# Patient Record
Sex: FEMALE | Race: WHITE | Employment: OTHER | URBAN - METROPOLITAN AREA
[De-identification: names, ages, dates, MRNs, and addresses within clinical notes are randomized per-mention and may not be internally consistent; named-entity substitution may affect disease eponyms.]

---

## 2016-06-17 LAB
HEP C HIM: NORMAL
PAP-ABSTRACT: NORMAL

## 2017-06-20 ENCOUNTER — TRANSFERRED RECORDS (OUTPATIENT)
Dept: HEALTH INFORMATION MANAGEMENT | Facility: CLINIC | Age: 62
End: 2017-06-20

## 2017-07-26 ENCOUNTER — OFFICE VISIT (OUTPATIENT)
Dept: FAMILY MEDICINE | Facility: CLINIC | Age: 62
End: 2017-07-26
Payer: COMMERCIAL

## 2017-07-26 VITALS
OXYGEN SATURATION: 95 % | BODY MASS INDEX: 27.79 KG/M2 | WEIGHT: 162.8 LBS | DIASTOLIC BLOOD PRESSURE: 78 MMHG | HEART RATE: 86 BPM | SYSTOLIC BLOOD PRESSURE: 128 MMHG | HEIGHT: 64 IN | TEMPERATURE: 98.1 F

## 2017-07-26 DIAGNOSIS — R42 DIZZINESS: Primary | ICD-10-CM

## 2017-07-26 DIAGNOSIS — L90.0 LICHEN SCLEROSUS: ICD-10-CM

## 2017-07-26 LAB
ALBUMIN SERPL-MCNC: 3.9 G/DL (ref 3.4–5)
ALP SERPL-CCNC: 90 U/L (ref 40–150)
ALT SERPL W P-5'-P-CCNC: 39 U/L (ref 0–50)
ANION GAP SERPL CALCULATED.3IONS-SCNC: 7 MMOL/L (ref 3–14)
AST SERPL W P-5'-P-CCNC: 24 U/L (ref 0–45)
BASOPHILS # BLD AUTO: 0 10E9/L (ref 0–0.2)
BASOPHILS NFR BLD AUTO: 0.6 %
BILIRUB SERPL-MCNC: 0.3 MG/DL (ref 0.2–1.3)
BUN SERPL-MCNC: 14 MG/DL (ref 7–30)
CALCIUM SERPL-MCNC: 9.4 MG/DL (ref 8.5–10.1)
CHLORIDE SERPL-SCNC: 105 MMOL/L (ref 94–109)
CO2 SERPL-SCNC: 29 MMOL/L (ref 20–32)
CREAT SERPL-MCNC: 0.86 MG/DL (ref 0.52–1.04)
DIFFERENTIAL METHOD BLD: NORMAL
EOSINOPHIL # BLD AUTO: 0.1 10E9/L (ref 0–0.7)
EOSINOPHIL NFR BLD AUTO: 1.7 %
ERYTHROCYTE [DISTWIDTH] IN BLOOD BY AUTOMATED COUNT: 12.5 % (ref 10–15)
GFR SERPL CREATININE-BSD FRML MDRD: 67 ML/MIN/1.7M2
GLUCOSE SERPL-MCNC: 98 MG/DL (ref 70–99)
HBA1C MFR BLD: 5.5 % (ref 4.3–6)
HCT VFR BLD AUTO: 42.4 % (ref 35–47)
HGB BLD-MCNC: 14.2 G/DL (ref 11.7–15.7)
LYMPHOCYTES # BLD AUTO: 2.4 10E9/L (ref 0.8–5.3)
LYMPHOCYTES NFR BLD AUTO: 34.5 %
MCH RBC QN AUTO: 31 PG (ref 26.5–33)
MCHC RBC AUTO-ENTMCNC: 33.5 G/DL (ref 31.5–36.5)
MCV RBC AUTO: 93 FL (ref 78–100)
MONOCYTES # BLD AUTO: 0.8 10E9/L (ref 0–1.3)
MONOCYTES NFR BLD AUTO: 11.1 %
NEUTROPHILS # BLD AUTO: 3.6 10E9/L (ref 1.6–8.3)
NEUTROPHILS NFR BLD AUTO: 52.1 %
PLATELET # BLD AUTO: 327 10E9/L (ref 150–450)
POTASSIUM SERPL-SCNC: 3.8 MMOL/L (ref 3.4–5.3)
PROT SERPL-MCNC: 7.4 G/DL (ref 6.8–8.8)
RBC # BLD AUTO: 4.58 10E12/L (ref 3.8–5.2)
SODIUM SERPL-SCNC: 141 MMOL/L (ref 133–144)
TSH SERPL DL<=0.005 MIU/L-ACNC: 0.9 MU/L (ref 0.4–4)
WBC # BLD AUTO: 7 10E9/L (ref 4–11)

## 2017-07-26 PROCEDURE — 80050 GENERAL HEALTH PANEL: CPT | Performed by: PHYSICIAN ASSISTANT

## 2017-07-26 PROCEDURE — 99214 OFFICE O/P EST MOD 30 MIN: CPT | Performed by: PHYSICIAN ASSISTANT

## 2017-07-26 PROCEDURE — 36415 COLL VENOUS BLD VENIPUNCTURE: CPT | Performed by: PHYSICIAN ASSISTANT

## 2017-07-26 PROCEDURE — 83036 HEMOGLOBIN GLYCOSYLATED A1C: CPT | Performed by: PHYSICIAN ASSISTANT

## 2017-07-26 RX ORDER — MECLIZINE HCL 25MG 25 MG/1
25 TABLET, CHEWABLE ORAL EVERY 6 HOURS PRN
COMMUNITY

## 2017-07-26 RX ORDER — CLOBETASOL PROPIONATE 0.5 MG/G
CREAM TOPICAL
Qty: 60 G | Refills: 3 | Status: SHIPPED | OUTPATIENT
Start: 2017-07-26

## 2017-07-26 NOTE — PROGRESS NOTES
SUBJECTIVE:                                                    Loreto Lima is a 61 year old female who presents to clinic today for the following health issues:      Dizziness  Onset: 2 weeks    Description:   Do you feel faint:  no   Does it feel like the surroundings (bed, room) are moving: no  Unsteady/off balance: YES  Have you passed out or fallen: no     Progression of Symptoms:  same    Accompanying Signs & Symptoms:  Heart palpitations: no   Nausea, vomiting: YES- nausea  Weakness in arms or legs: no   Fatigue: YES  Vision or speech changes: no   Ringing in ears (Tinnitus): no   Hearing Loss: no     History:   Head trauma/concussion hx: no   Previous similar symptoms: no   Recent bleeding history: no     Precipitating factors:   Worse with activity or head movement: YES  Any new medications (BP?): no   Alcohol/drug abuse/withdrawal: no     Alleviating factors:   Does staying in a fixed position give relief:  no     Therapies Tried and outcome: OTC Bonine     She has been noticing that she is wobbly when standing still. NO spinning. Just feels off balance. All day long. Nothing seems to make it better or worse.   Trying to drink more water.   No new medications. Took over the counter meclizine yesterday which helped a lot.   Father with diabetes.             Rash  Duration of complaint: 1 year, rash is itchy, rash goes from in between vagina and butt, and pt reports it is painful.    Not sexually active.   Has been using a steroid cream BID and it didn't help. Tried diflucan x 2 which out any improvement. When it flares it hurts.   Has seen GYN for this. Reviewed care everywhere and patient was diagnosed with lichen sclerosis. Patient notes she never got the prescription for clobetasol as was prescribed.         Problem list and histories reviewed & adjusted, as indicated.  Additional history: as documented      Reviewed and updated as needed this visit by clinical staffTobacco  Allergies  Meds  Med  "Hx  Surg Hx  Fam Hx  Soc Hx      Reviewed and updated as needed this visit by Provider         ROS:  Constitutional, HEENT, cardiovascular, pulmonary, gi and gu systems are negative, except as otherwise noted.      OBJECTIVE:   /78 (BP Location: Right arm, Patient Position: Chair, Cuff Size: Adult Regular)  Pulse 86  Temp 98.1  F (36.7  C) (Oral)  Ht 5' 3.62\" (1.616 m)  Wt 162 lb 12.8 oz (73.8 kg)  SpO2 95%  BMI 28.28 kg/m2  Body mass index is 28.28 kg/(m^2).  GENERAL: healthy, alert and no distress  EYES: Eyes grossly normal to inspection, PERRL and conjunctivae and sclerae normal  HENT: ear canals and TM's normal, nose and mouth without ulcers or lesions  NECK: no adenopathy, no asymmetry, masses, or scars and thyroid normal to palpation  RESP: lungs clear to auscultation - no rales, rhonchi or wheezes  CV: regular rate and rhythm, normal S1 S2, no S3 or S4, no murmur, click or rub, no peripheral edema and peripheral pulses strong  MS: no gross musculoskeletal defects noted, no edema  SKIN: On the skin surrounding the rectum is is red and irritated with minimal thickening. This extends up to the lower edges of the labia.   NEURO: Normal strength and tone, mentation intact and speech normal, CN II-XII intact. deep tendon reflexes normal.     Diagnostic Test Results:  none     ASSESSMENT/PLAN:       ICD-10-CM    1. Dizziness R42 Comprehensive metabolic panel     CBC with platelets differential     TSH with free T4 reflex     Hemoglobin A1c   2. Lichen sclerosus L90.0 clobetasol (TEMOVATE) 0.05 % cream   Can continue to use over the counter meclizine for dizziness. Basic labs today. No worrisome neurological findings on exam.   Will try the clobetasol for 6-12 weeks if not improving will need to see derm.     FUTURE APPOINTMENTS:       - Follow-up for annual visit or as needed    Dorcas Jay PA-C  [facility]    "

## 2017-07-26 NOTE — NURSING NOTE
"Chief Complaint   Patient presents with     Dizziness     Health Maintenance     Hep C, Mammo, and Pap       Initial /78 (BP Location: Right arm, Patient Position: Chair, Cuff Size: Adult Regular)  Pulse 86  Temp 98.1  F (36.7  C) (Oral)  Ht 5' 3.62\" (1.616 m)  Wt 162 lb 12.8 oz (73.8 kg)  SpO2 95%  BMI 28.28 kg/m2 Estimated body mass index is 28.28 kg/(m^2) as calculated from the following:    Height as of this encounter: 5' 3.62\" (1.616 m).    Weight as of this encounter: 162 lb 12.8 oz (73.8 kg).  Medication Reconciliation: lizbeth Stevens MA      "

## 2017-07-26 NOTE — MR AVS SNAPSHOT
"              After Visit Summary   7/26/2017    Loreto Lima    MRN: [id number]           Patient Information     Date Of Birth          1955        Visit Information        Provider Department      7/26/2017 2:00 PM Dorcas Jay PA-C [facility]        Today's Diagnoses     Dizziness    -  1    Lichen sclerosus           Follow-ups after your visit        Who to contact     If you have questions or need follow up information about today's clinic visit or your schedule please contact [facility] directly at [phone].  Normal or non-critical lab and imaging results will be communicated to you by Nimblefish Technologieshart, letter or phone within 4 business days after the clinic has received the results. If you do not hear from us within 7 days, please contact the clinic through coin4cet or phone. If you have a critical or abnormal lab result, we will notify you by phone as soon as possible.  Submit refill requests through SoBiz10 or call your pharmacy and they will forward the refill request to us. Please allow 3 business days for your refill to be completed.          Additional Information About Your Visit        MyChart Information     [email or web address] gives you secure access to your electronic health record. If you see a primary care provider, you can also send messages to your care team and make appointments. If you have questions, please call your primary care clinic.  If you do not have a primary care provider, please call [phone] and they will assist you.        Care EveryWhere ID     This is your Care EveryWhere ID. This could be used by other organizations to access your [facility] medical records  [id number]        Your Vitals Were     Pulse Temperature Height Pulse Oximetry BMI (Body Mass Index)       86 98.1  F (36.7  C) (Oral) 5' 3.62\" (1.616 m) 95% 28.28 kg/m2        Blood Pressure from Last 3 Encounters:   07/26/17 128/78   01/12/16 111/68   02/11/15 124/75    " Weight from Last 3 Encounters:   07/26/17 162 lb 12.8 oz (73.8 kg)   01/12/16 167 lb (75.8 kg)   02/11/15 164 lb (74.4 kg)              We Performed the Following     CBC with platelets differential     Comprehensive metabolic panel     Hemoglobin A1c     TSH with free T4 reflex        Primary Care Provider Office Phone # Fax #    Augustine Bender [phone]       [facility] [address]  [place], [place]. \Hospital Has a New Name and Outlook.\"" [id number]        Equal Access to Services     MATT WU : Hadii aad ku hadasho Soomaali, waaxda luqadaha, qaybta kaalmada adeegyada, waxay idiin hayaan adeeg vicente riojas . So [facility] [phone].    ATENCIÓN: Si habla español, tiene a verdugo disposición servicios gratuitos de asistencia lingüística. Llame al [phone].    We comply with applicable federal civil rights laws and [place] laws. We do not discriminate on the basis of race, color, national origin, age, disability sex, sexual orientation or gender identity.            Thank you!     Thank you for choosing [facility]  for your care. Our goal is always to provide you with excellent care. Hearing back from our patients is one way we can continue to improve our services. Please take a few minutes to complete the written survey that you may receive in the mail after your visit with us. Thank you!             Your Updated Medication List - Protect others around you: Learn how to safely use, store and throw away your medicines at www.disposemymeds.org.          This list is accurate as of: 7/26/17  2:34 PM.  Always use your most recent med list.                   Brand Name Dispense Instructions for use Diagnosis    acyclovir 5 % ointment    ZOVIRAX    15 g    Apply up to 5 x daily as needed for cold sores    Recurrent cold sores       DAILY MULTIVITAMIN PO      Take  by mouth daily.        meclizine 25 MG Chew      Take 25 mg by mouth every 6 hours as needed for dizziness        triamcinolone  0.1 % cream    KENALOG    30 g    Apply sparingly to affected areas 2-3 x daily as needed    Eczema, unspecified eczema

## 2017-11-27 ENCOUNTER — TRANSFERRED RECORDS (OUTPATIENT)
Dept: HEALTH INFORMATION MANAGEMENT | Facility: CLINIC | Age: 62
End: 2017-11-27

## 2018-10-09 ENCOUNTER — OFFICE VISIT (OUTPATIENT)
Dept: DERMATOLOGY | Facility: CLINIC | Age: 63
End: 2018-10-09
Payer: COMMERCIAL

## 2018-10-09 DIAGNOSIS — L82.1 SEBORRHEIC KERATOSIS: ICD-10-CM

## 2018-10-09 DIAGNOSIS — L90.0 LICHEN SCLEROSUS: ICD-10-CM

## 2018-10-09 DIAGNOSIS — L21.9 DERMATITIS, SEBORRHEIC: ICD-10-CM

## 2018-10-09 DIAGNOSIS — L98.9 SKIN LESION: Primary | ICD-10-CM

## 2018-10-09 DIAGNOSIS — Z80.8 FAMILY HISTORY OF MELANOMA: ICD-10-CM

## 2018-10-09 DIAGNOSIS — L81.4 SOLAR LENTIGINOSIS: ICD-10-CM

## 2018-10-09 PROCEDURE — 99203 OFFICE O/P NEW LOW 30 MIN: CPT | Performed by: DERMATOLOGY

## 2018-10-09 RX ORDER — VALACYCLOVIR HYDROCHLORIDE 1 G/1
TABLET, FILM COATED ORAL
COMMUNITY
Start: 2018-01-18

## 2018-10-09 NOTE — MR AVS SNAPSHOT
After Visit Summary   10/9/2018    Loreto Lima    MRN: [id number]           Patient Information     Date Of Birth          1955        Visit Information        Provider Department      10/9/2018 12:15 PM Mariel Pagan MD [facility]        Today's Diagnoses     Skin lesion    -  1    Family history of melanoma        Solar lentiginosis        Dermatitis, seborrheic        Lichen sclerosus        Seborrheic keratosis           Follow-ups after your visit        Follow-up notes from your care team     Return in about 2 weeks (around 10/23/2018) for spot on the right thigh.      Who to contact     If you have questions or need follow up information about today's clinic visit or your schedule please contact [facility] directly at [phone].  Normal or non-critical lab and imaging results will be communicated to you by Sticherhart, letter or phone within 4 business days after the clinic has received the results. If you do not hear from us within 7 days, please contact the clinic through Sticherhart or phone. If you have a critical or abnormal lab result, we will notify you by phone as soon as possible.  Submit refill requests through WebXiom or call your pharmacy and they will forward the refill request to us. Please allow 3 business days for your refill to be completed.          Additional Information About Your Visit        MyChart Information     WebXiom gives you secure access to your electronic health record. If you see a primary care provider, you can also send messages to your care team and make appointments. If you have questions, please call your primary care clinic.  If you do not have a primary care provider, please call [phone] and they will assist you.      WebXiom is an electronic gateway that provides easy, online access to your medical records. With WebXiom, you can request a clinic appointment, read your test results, renew a prescription or  communicate with your care team.     To access your existing account, please contact your [facility] or call [phone] for assistance.        Care EveryWhere ID     This is your Care EveryWhere ID. This could be used by other organizations to access your [facility] medical records  [id number]         Blood Pressure from Last 3 Encounters:   07/26/17 128/78   01/12/16 111/68   02/11/15 124/75    Weight from Last 3 Encounters:   07/26/17 73.8 kg (162 lb 12.8 oz)   01/12/16 75.8 kg (167 lb)   02/11/15 74.4 kg (164 lb)              Today, you had the following     No orders found for display       Primary Care Provider Office Phone # Fax #    Augustine Bender [phone]       [address] [facility] [address]        Equal Access to Services     [facility]: Hadii aad ku hadasho Soomaali, waaxda luqadaha, qaybta kaalmada adeegyada, waxay idiin hayaan ernesto riojas . So [facility] [phone].    ATENCIÓN: Si habla español, tiene a verdugo disposición servicios gratuitos de asistencia lingüística. Llame al [phone].    We comply with applicable federal civil rights laws and [place] laws. We do not discriminate on the basis of race, color, national origin, age, disability, sex, sexual orientation, or gender identity.            Thank you!     Thank you for choosing [facility]  for your care. Our goal is always to provide you with excellent care. Hearing back from our patients is one way we can continue to improve our services. Please take a few minutes to complete the written survey that you may receive in the mail after your visit with us. Thank you!             Your Updated Medication List - Protect others around you: Learn how to safely use, store and throw away your medicines at www.disposemymeds.org.          This list is accurate as of 10/9/18  1:03 PM.  Always use your most recent med list.                   Brand Name Dispense  Instructions for use Diagnosis    acyclovir 5 % ointment    ZOVIRAX    15 g    Apply up to 5 x daily as needed for cold sores    Recurrent cold sores       clobetasol 0.05 % cream    TEMOVATE    60 g    Apply sparingly to affected area twice daily as needed.  Do not apply to face.    Lichen sclerosus       DAILY MULTIVITAMIN PO      Take  by mouth daily.        meclizine 25 MG Chew      Take 25 mg by mouth every 6 hours as needed for dizziness        triamcinolone 0.1 % cream    KENALOG    30 g    Apply sparingly to affected areas 2-3 x daily as needed    Eczema, unspecified eczema       valACYclovir 1000 mg tablet    VALTREX

## 2018-10-09 NOTE — NURSING NOTE
Loreto Lima's goals for this visit include:   Chief Complaint   Patient presents with     Derm Problem     skin check, daughter has had melanoma        She requests these members of her care team be copied on today's visit information: yes     PCP: Augustine Bender    Referring Provider:  No referring provider defined for this encounter.    There were no vitals taken for this visit.    Do you need any medication refills at today's visit? No     Amorrae LEVI Shaw

## 2018-10-09 NOTE — LETTER
"    10/9/2018         RE: Loreto Lima  [address] [place] [address]        Dear Colleague,    Thank you for referring your patient, Loreto Lima, to the [facility]. Please see a copy of my visit note below.    [facility] Dermatology Note      Dermatology Problem List:  1. AK  2. Family hx of melanoma  3. Lichen sclerosus    Encounter Date: Oct 9, 2018    CC:  Chief Complaint   Patient presents with     Derm Problem     skin check, daughter has had melanoma          History of Present Illness:  Ms. Loreto Lima is a 62 year old female who with no history of skin cancer presents for a skin check. She has been seen at [facility] in the past and has a history of actinic keratoses. Her daughter has had melanoma. The patient admits that she took a mole off of her skin, but she asserts that it was normal. The patient has lichen sclerosus in the genital area and was prescribed clobetasol for this. She only uses the topical for flares. She thinks that this has spread to her ears and in between her toes - these areas become \"scabby\" during the winter months. Clobetasol helps in these area. Additionally, she admits that she has dealt with solar urticaria for some time.     Past Medical History:   Patient Active Problem List   Diagnosis     CARDIOVASCULAR SCREENING; LDL GOAL LESS THAN 160     Hyperlipidemia LDL goal <130     Advanced directives, counseling/discussion     Past Medical History:   Diagnosis Date     Abnormal Pap smear     HISTORY OF     History of fibromyalgia      Hx-cervical dysplasia      Palpitations      Tobacco abuse      Past Surgical History:   Procedure Laterality Date     COLONOSCOPY  11-27-12    Repeat Colonoscopy in 5 yrs.     TUBAL LIGATION  1990       Social History:   reports that she quit smoking about 13 years ago. She has never used smokeless tobacco. She reports that she does not drink alcohol or use illicit drugs. Works in " construction.     Family History:  Family History   Problem Relation Age of Onset     Diabetes Father      HEART DISEASE Father      Lipids Father      Cerebrovascular Disease No family hx of      Cancer - colorectal Maternal Grandfather      Cancer Paternal Grandmother      pancreatic     Cancer Maternal Grandmother      lung       Medications:  Current Outpatient Prescriptions   Medication Sig Dispense Refill     acyclovir (ZOVIRAX) 5 % ointment Apply up to 5 x daily as needed for cold sores 15 g 3     clobetasol (TEMOVATE) 0.05 % cream Apply sparingly to affected area twice daily as needed.  Do not apply to face. 60 g 3     meclizine 25 MG CHEW Take 25 mg by mouth every 6 hours as needed for dizziness       Multiple Vitamin (DAILY MULTIVITAMIN PO) Take  by mouth daily.       triamcinolone (KENALOG) 0.1 % cream Apply sparingly to affected areas 2-3 x daily as needed 30 g 1     valACYclovir (VALTREX) 1000 mg tablet          Allergies   Allergen Reactions     Macrobid [Nitrofuran Derivatives]        Review of Systems:  -Constitutional: The patient is feeling generally well.   -Denies genital sores  -Skin: As above in HPI. No additional skin concerns.    Physical exam:  Vitals: There were no vitals taken for this visit.  GEN: This is a well developed, well-nourished female in no acute distress, in a pleasant mood.    SKIN: Full skin, which includes the head/face, both arms, chest, back, abdomen,both legs, genitalia and/or groin buttocks, digits and/or nails, was examined. Nails are painted. Scribe present.   -Scattered brown macules on sun exposed areas.  -There is a waxy stuck on tan to brown papule on the left forearm.  - Erythematous patch, 1.5 cm, no telangiectasias, on the left buttock  - Hyperpigmented, erythematous, violaceous patches on the labia majora.  - There is macular erythema of the left conchal bowl with mild flaky white scale.  -No other lesions of concern on areas examined.        Impression/Plan:  1. Hx of actinic keratosis, no evidence of recurrence.     2. Family hx of melanoma.    ABCD's of melanoma were reviewed with patient and handout provided.     3. Seborrheic keratosis, solar lentigines    No further intervention.     4. Erythematous patch, 1.5 cm, no telangiectasias, on the left buttock    We will recheck this area in two weeks, this may be traumatic, if not then we recommended a biopsy    5. Lichen sclerosus    Continue clobetasol for flares fo rup to 3 weeks in a row, then 3 weeks off, or shorter periods of use okay    6. Seborrheic dermatitis, left ear    Pt reports that she has triamcinolone at home - advised her to start applying this to the affected area BID for up to two weeks.     Follow-up in 2 weeks, earlier for new or changing lesions.       Staff Involved:  Scribe/Staff    Scribe Disclosure  I, Jason Ortega, am serving as a scribe to document services personally performed by Dr. Mariel Pagan MD, based on data collection and the provider's statements to me.     Provider Disclosure:   The documentation recorded by the scribe accurately reflects the services I personally performed and the decisions made by me.    Mariel Pagan MD    Department of Dermatology  [facility]: Phone: [phone], Fax:[phone]  [facility]: Phone: [phone], Fax: [phone]        Again, thank you for allowing me to participate in the care of your patient.        Sincerely,        Mariel Pagan MD

## 2018-10-09 NOTE — PROGRESS NOTES
"[facility] Dermatology Note      Dermatology Problem List:  1. AK  2. Family hx of melanoma  3. Lichen sclerosus    Encounter Date: Oct 9, 2018    CC:  Chief Complaint   Patient presents with     Derm Problem     skin check, daughter has had melanoma          History of Present Illness:  Ms. Loreto Lima is a 62 year old female who with no history of skin cancer presents for a skin check. She has been seen at [facility] in the past and has a history of actinic keratoses. Her daughter has had melanoma. The patient admits that she took a mole off of her skin, but she asserts that it was normal. The patient has lichen sclerosus in the genital area and was prescribed clobetasol for this. She only uses the topical for flares. She thinks that this has spread to her ears and in between her toes - these areas become \"scabby\" during the winter months. Clobetasol helps in these area. Additionally, she admits that she has dealt with solar urticaria for some time.     Past Medical History:   Patient Active Problem List   Diagnosis     CARDIOVASCULAR SCREENING; LDL GOAL LESS THAN 160     Hyperlipidemia LDL goal <130     Advanced directives, counseling/discussion     Past Medical History:   Diagnosis Date     Abnormal Pap smear     HISTORY OF     History of fibromyalgia      Hx-cervical dysplasia      Palpitations      Tobacco abuse      Past Surgical History:   Procedure Laterality Date     COLONOSCOPY  11-27-12    Repeat Colonoscopy in 5 yrs.     TUBAL LIGATION  1990       Social History:   reports that she quit smoking about 13 years ago. She has never used smokeless tobacco. She reports that she does not drink alcohol or use illicit drugs. Works in construction.     Family History:  Family History   Problem Relation Age of Onset     Diabetes Father      HEART DISEASE Father      Lipids Father      Cerebrovascular Disease No family hx of      Cancer - colorectal Maternal Grandfather      Cancer " Paternal Grandmother      pancreatic     Cancer Maternal Grandmother      lung       Medications:  Current Outpatient Prescriptions   Medication Sig Dispense Refill     acyclovir (ZOVIRAX) 5 % ointment Apply up to 5 x daily as needed for cold sores 15 g 3     clobetasol (TEMOVATE) 0.05 % cream Apply sparingly to affected area twice daily as needed.  Do not apply to face. 60 g 3     meclizine 25 MG CHEW Take 25 mg by mouth every 6 hours as needed for dizziness       Multiple Vitamin (DAILY MULTIVITAMIN PO) Take  by mouth daily.       triamcinolone (KENALOG) 0.1 % cream Apply sparingly to affected areas 2-3 x daily as needed 30 g 1     valACYclovir (VALTREX) 1000 mg tablet          Allergies   Allergen Reactions     Macrobid [Nitrofuran Derivatives]        Review of Systems:  -Constitutional: The patient is feeling generally well.   -Denies genital sores  -Skin: As above in HPI. No additional skin concerns.    Physical exam:  Vitals: There were no vitals taken for this visit.  GEN: This is a well developed, well-nourished female in no acute distress, in a pleasant mood.    SKIN: Full skin, which includes the head/face, both arms, chest, back, abdomen,both legs, genitalia and/or groin buttocks, digits and/or nails, was examined. Nails are painted. Scribe present.   -Scattered brown macules on sun exposed areas.  -There is a waxy stuck on tan to brown papule on the left forearm.  - Erythematous patch, 1.5 cm, no telangiectasias, on the left buttock  - Hyperpigmented, erythematous, violaceous patches on the labia majora.  - There is macular erythema of the left conchal bowl with mild flaky white scale.  -No other lesions of concern on areas examined.       Impression/Plan:  1. Hx of actinic keratosis, no evidence of recurrence.     2. Family hx of melanoma.    ABCD's of melanoma were reviewed with patient and handout provided.     3. Seborrheic keratosis, solar lentigines    No further intervention.     4. Erythematous  patch, 1.5 cm, no telangiectasias, on the left buttock    We will recheck this area in two weeks, this may be traumatic, if not then we recommended a biopsy    5. Lichen sclerosus    Continue clobetasol for flares fo rup to 3 weeks in a row, then 3 weeks off, or shorter periods of use okay    6. Seborrheic dermatitis, left ear    Pt reports that she has triamcinolone at home - advised her to start applying this to the affected area BID for up to two weeks.     Follow-up in 2 weeks, earlier for new or changing lesions.       Staff Involved:  Scribe/Staff    Scribe Disclosure  I, Jason Ortega, am serving as a scribe to document services personally performed by Dr. Mariel Pagan MD, based on data collection and the provider's statements to me.     Provider Disclosure:   The documentation recorded by the scribe accurately reflects the services I personally performed and the decisions made by me.    Mariel Pagan MD    Department of Dermatology  [facility]: Phone: [phone], Fax:[phone]  [facility]: Phone: [phone], Fax: [phone]

## 2020-02-23 ENCOUNTER — HEALTH MAINTENANCE LETTER (OUTPATIENT)
Age: 65
End: 2020-02-23

## 2020-12-12 ENCOUNTER — HEALTH MAINTENANCE LETTER (OUTPATIENT)
Age: 65
End: 2020-12-12

## 2021-01-04 ENCOUNTER — TRANSFERRED RECORDS (OUTPATIENT)
Dept: HEALTH INFORMATION MANAGEMENT | Facility: CLINIC | Age: 66
End: 2021-01-04

## 2021-04-11 ENCOUNTER — HEALTH MAINTENANCE LETTER (OUTPATIENT)
Age: 66
End: 2021-04-11

## 2021-09-26 ENCOUNTER — HEALTH MAINTENANCE LETTER (OUTPATIENT)
Age: 66
End: 2021-09-26

## 2022-02-16 ENCOUNTER — ANCILLARY PROCEDURE (OUTPATIENT)
Dept: CT IMAGING | Facility: CLINIC | Age: 67
End: 2022-02-16
Attending: FAMILY MEDICINE
Payer: COMMERCIAL

## 2022-02-16 DIAGNOSIS — Z12.2 SCREENING FOR LUNG CANCER: ICD-10-CM

## 2022-02-16 PROCEDURE — 71271 CT THORAX LUNG CANCER SCR C-: CPT | Mod: TC | Performed by: RADIOLOGY

## 2022-03-13 ENCOUNTER — HEALTH MAINTENANCE LETTER (OUTPATIENT)
Age: 67
End: 2022-03-13

## 2023-01-08 ENCOUNTER — HEALTH MAINTENANCE LETTER (OUTPATIENT)
Age: 68
End: 2023-01-08

## 2024-04-21 ENCOUNTER — HEALTH MAINTENANCE LETTER (OUTPATIENT)
Age: 69
End: 2024-04-21